# Patient Record
Sex: FEMALE | Race: WHITE
[De-identification: names, ages, dates, MRNs, and addresses within clinical notes are randomized per-mention and may not be internally consistent; named-entity substitution may affect disease eponyms.]

---
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
## 2018-02-05 ENCOUNTER — HOSPITAL ENCOUNTER (EMERGENCY)
Dept: HOSPITAL 25 - UCCORT | Age: 18
Discharge: HOME | End: 2018-02-05
Payer: COMMERCIAL

## 2018-02-05 VITALS — DIASTOLIC BLOOD PRESSURE: 65 MMHG | SYSTOLIC BLOOD PRESSURE: 127 MMHG

## 2018-02-05 DIAGNOSIS — J40: Primary | ICD-10-CM

## 2018-02-05 PROCEDURE — G0463 HOSPITAL OUTPT CLINIC VISIT: HCPCS

## 2018-02-05 PROCEDURE — 99212 OFFICE O/P EST SF 10 MIN: CPT

## 2018-02-05 PROCEDURE — 87502 INFLUENZA DNA AMP PROBE: CPT

## 2018-02-05 NOTE — UC
FLU HPI





- HPI Summary


HPI Summary: 





Pt c/o of sudden onset of cough, fever, chills, wheezing X 2 days. Pt has 

history of bronchitis





- History of Current Complaint


Chief Complaint: UCRespiratory


Stated Complaint: CHEST CONGESTION, RESPIRATORY


Time Seen by Provider: 02/05/18 20:02


Hx Obtained From: Patient


Hx Last Menstrual Period: 1/22/18


Pregnant?: No


Onset/Duration: Sudden Onset, Lasting Days


Severity Currently: Mild


Severity Initially: Mild


Pain Intensity: 3


Associated Signs & Symptoms: Positive: Fever, Cough


Related Hx: Possible Flu/Infectious Exposure





- Risk Factors


Influenza Risk Factors: Negative





- Allergy/Home Medications


Allergies/Adverse Reactions: 


 Allergies











Allergy/AdvReac Type Severity Reaction Status Date / Time


 


MS Ibuprofen [Ibuprofen] Allergy Severe Anaphylatic Verified 02/05/18 19:54





   Shock  











Home Medications: 


 Home Medications





Oral Contraceptives  DAILY 02/05/18 [History]











PMH/Surg Hx/FS Hx/Imm Hx


Previously Healthy: Yes





- Surgical History


Surgical History: None





- Family History


Known Family History: 


   Negative: Respiratory Disease





- Social History


Occupation: Student


Lives: Dormitory/Roommates


Alcohol Use: None


Substance Use Type: None


Smoking Status (MU): Never Smoked Tobacco


Have You Smoked in the Last Year: No





- Immunization History


Most Recent Influenza Vaccination: No flu vaccine


Vaccination Up to Date: Yes





Review of Systems


Constitutional: Fever, Chills, Fatigue


Skin: Negative


Eyes: Negative


ENT: Sinus Congestion


Respiratory: Shortness Of Breath, Cough


Cardiovascular: Negative


Gastrointestinal: Negative


Genitourinary: Negative


Motor: Negative


Neurovascular: Negative


Musculoskeletal: Negative


Neurological: Negative


Psychological: Negative


Is Patient Immunocompromised?: No


All Other Systems Reviewed And Are Negative: Yes





Physical Exam


Triage Information Reviewed: Yes


Appearance: Ill-Appearing


Vital Signs: 


 Initial Vital Signs











Temp  100.4 F   02/05/18 19:50


 


Pulse  95   02/05/18 19:50


 


Resp  16   02/05/18 19:50


 


BP  127/65   02/05/18 19:50


 


Pulse Ox  100   02/05/18 19:50











Eye Exam: Normal


ENT: Positive: Nasal congestion


Dental Exam: Normal


Neck exam: Normal


Respiratory Exam: Normal


Cardiovascular Exam: Normal


Musculoskeletal Exam: Normal


Neurological Exam: Normal


Psychological Exam: Normal


Skin Exam: Normal





Diagnostics





- Laboratory


Diagnostic Studies Completed/Ordered: negative rapid flu





Flu Course/Dx





- Differential Dx/Diagnosis


Differential Diagnosis/HQI/PQRI: Bronchitis, Influenza


Provider Diagnoses: Bronchitis





Discharge





- Discharge Plan


Condition: Stable


Disposition: HOME


Prescriptions: 


Albuterol HFA INHALER* [Ventolin HFA Inhaler*] 1 - 2 puff INH Q6H PRN #1 mdi


 PRN Reason: Sob/Wheezing


Azithromycin TAB* [Zithromax TAB (Z-NAT) 250 mg #6 tabs] 2 tab PO .TODAY, THEN 

1 DAILY #1 nat


predniSONE TAB* [Deltasone TAB*] 20 mg PO DAILY #5 tab


Patient Education Materials:  Acute Bronchitis (ED)


Referrals: 


[facility] [place] PHYSICIAN REFERRAL [Outside]


Non Staff,Doctor [Primary Care Provider] -

## 2019-04-18 ENCOUNTER — HOSPITAL ENCOUNTER (EMERGENCY)
Dept: HOSPITAL 25 - UCCORT | Age: 19
Discharge: HOME | End: 2019-04-18
Payer: COMMERCIAL

## 2019-04-18 VITALS — SYSTOLIC BLOOD PRESSURE: 124 MMHG | DIASTOLIC BLOOD PRESSURE: 80 MMHG

## 2019-04-18 DIAGNOSIS — Z88.8: ICD-10-CM

## 2019-04-18 DIAGNOSIS — J06.9: Primary | ICD-10-CM

## 2019-04-18 PROCEDURE — 87651 STREP A DNA AMP PROBE: CPT

## 2019-04-18 PROCEDURE — 99211 OFF/OP EST MAY X REQ PHY/QHP: CPT

## 2019-04-18 PROCEDURE — G0463 HOSPITAL OUTPT CLINIC VISIT: HCPCS

## 2019-04-18 NOTE — ED
Throat Pain/Nasal Congestion





- HPI Summary


HPI Summary: 





18 yr old female with the complaint of sore throat.  Onset four days ago.  The 

patient has sore throat, and no other symptoms.  She denies runny nose, post 

nasal drip, cough.  She has not had fever.  She has not had drooling.   





- History of Current Complaint


Chief Complaint: UCGeneralIllness


Time Seen by Provider: 04/18/19 18:13





- Allergies/Home Medications


Allergies/Adverse Reactions: 


 Allergies











Allergy/AdvReac Type Severity Reaction Status Date / Time


 


ibuprofen Allergy  Anaphylatic Verified 04/18/19 18:10





   Shock  











Home Medications: 


 Home Medications





Acetaminophen [Tylenol Extra Strength] 1,000 mg PO DAILY 04/18/19 [History 

Confirmed 04/18/19]











PMH/Surg Hx/FS Hx/Imm Hx


Infectious Disease History: No


Infectious Disease History: 


   Denies: Traveled Outside the [place] in Last 30 Days





- Family History


Known Family History: Positive: None


   Negative: Respiratory Disease





- Social History


Occupation: Student


Alcohol Use: None


Substance Use Type: Reports: None


Smoking Status (MU): Never Smoked Tobacco


Have You Smoked in the Last Year: No





Review of Systems


Constitutional: Negative


Positive: Sore Throat


All Other Systems Reviewed And Are Negative: Yes





Physical Exam


Triage Information Reviewed: Yes


Vital Signs On Initial Exam: 


 Initial Vitals











Temp Pulse Resp BP Pulse Ox


 


 99.0 F   97   16   124/80   100 


 


 04/18/19 18:06  04/18/19 18:06  04/18/19 18:06  04/18/19 18:06  04/18/19 18:06











Vital Signs Reviewed: Yes


Appearance: Positive: Well-Appearing, No Pain Distress


Skin: Positive: Warm, Skin Color Reflects Adequate Perfusion


Head/Face: Positive: Normal Head/Face Inspection


Eyes: Positive: EOMI, TED


ENT: Positive: Pharyngeal erythema, TMs normal.  Negative: Nasal congestion, 

Nasal drainage


Neck: Positive: Nontender, No Lymphadenopathy


Respiratory/Lung Sounds: Positive: Clear to Auscultation, Breath Sounds Present


Cardiovascular: Positive: RRR.  Negative: Murmur


Abdomen Description: Negative: Distended


Musculoskeletal: Positive: Strength/ROM Intact


Neurological: Positive: Sensory/Motor Intact, Alert, Oriented to Person Place, 

Time, CN Intact II-III, Normal Gait, Speech Normal


Psychiatric: Positive: Normal





Diagnostics





- Vital Signs


 Vital Signs











  Temp Pulse Resp BP Pulse Ox


 


 04/18/19 18:06  99.0 F  97  16  124/80  100














- Laboratory


Lab Statement: Any lab studies that have been ordered have been reviewed, and 

results considered in the medical decision making process.





EENT Course/Dx





- Course


Course Of Treatment: 18 yr old female with sore throat. Rapid strep is neg.  DC 

home.





- Diagnoses


Provider Diagnoses: 


 Upper respiratory infection








Discharge





- Sign-Out/Discharge


Documenting (check all that apply): Patient Departure


All imaging exams completed and their final reports reviewed: No Studies





- Discharge Plan


Condition: Good


Disposition: HOME


Patient Education Materials:  Upper Respiratory Infection (ED)


Referrals: 


No Primary Care Phys,NOPCP [Primary Care Provider] - 


[facility] [place] 2 [place]





- Billing Disposition and Condition


Condition: GOOD


Disposition: Home

## 2020-02-01 ENCOUNTER — HOSPITAL ENCOUNTER (EMERGENCY)
Dept: HOSPITAL 25 - UCCORT | Age: 20
Discharge: HOME | End: 2020-02-01
Payer: COMMERCIAL

## 2020-02-01 VITALS — DIASTOLIC BLOOD PRESSURE: 64 MMHG | SYSTOLIC BLOOD PRESSURE: 118 MMHG

## 2020-02-01 DIAGNOSIS — Z88.6: ICD-10-CM

## 2020-02-01 DIAGNOSIS — N76.0: Primary | ICD-10-CM

## 2020-02-01 PROCEDURE — G0463 HOSPITAL OUTPT CLINIC VISIT: HCPCS

## 2020-02-01 PROCEDURE — 99212 OFFICE O/P EST SF 10 MIN: CPT

## 2020-02-01 NOTE — UC
Complaint Female HPI





- HPI Summary


HPI Summary: 


Recently tx'd for BV by GYN last week but had intercourse during tx w/out 

condom and had her menses. 


She was also given a cream for vaginal fungus.


 Pt has a little odor, discharge still there and has some pain with urination. 

Also c/o itchiness.





- History Of Current Complaint


Chief Complaint: UCGeneralIllness


Stated Complaint: PERSONAL


Time Seen by Provider: 02/01/20 17:11


Hx Obtained From: Patient


Hx Last Menstrual Period: 4/18/19


Pain Intensity: 0


Aggravating Factor(s): [place]


Alleviating Factor(s): Nothing





- Allergies/Home Medications


Allergies/Adverse Reactions: 


 Allergies











Allergy/AdvReac Type Severity Reaction Status Date / Time


 


ibuprofen Allergy  Anaphylatic Verified 02/01/20 17:07





   Shock  














PMH/Surg Hx/FS Hx/Imm Hx





- Additional Past Medical History


Additional PMH: 





no chronic illness


Previously Healthy: Yes





- Surgical History


Surgical History: None





- Family History


Known Family History: Positive: None


   Negative: Respiratory Disease





- Social History


Alcohol Use: Weekly


Substance Use Type: None


Smoking Status (MU): Never Smoked Tobacco


Have You Smoked in the Last Year: No





- Immunization History


Most Recent Influenza Vaccination: No flu vaccine


Vaccination Up to Date: Yes





Review of Systems


All Other Systems Reviewed And Are Negative: Yes


Constitutional: Negative: Fever


Gastrointestinal: Negative: Abdominal Pain


Genitourinary: Positive: Vaginal/Penile Burning, Vaginal/Penile Itching, Vaginal

/Penile Discharge, Vaginal/Penile Pain.  Negative: Dysuria, Vaginal/Penile 

Tenderness, Abnormal Bleeding





Physical Exam


Triage Information Reviewed: Yes


Appearance: Well-Appearing


Vital Signs: 


 Initial Vital Signs











Temp  98.6 F   02/01/20 17:01


 


Pulse  69   02/01/20 17:01


 


Resp  18   02/01/20 17:01


 


BP  118/64   02/01/20 17:01


 


Pulse Ox  99   02/01/20 17:01











Pelvic Exam: Positive: Other - declined





 Complaint Female Dx





- Course


Course Of Treatment: 


Vaginitis after being tx'd for BV but was only tx'd for 5 days and had 

unprotected intercourse during tx.


Plan is to retreat, discussed no intercourse for now.


vitals good.


declined gu exam and chlam/jackelin exam stating it was neg last week.





- Differential Dx/Diagnosis


Differential Diagnosis/HQI/PQRI: Urinary Tract Infection, Other


Provider Diagnosis: 


 Vaginitis








Discharge ED





- Sign-Out/Discharge


Documenting (check all that apply): Patient Departure


All imaging exams completed and their final reports reviewed: No Studies





- Discharge Plan


Condition: Good


Disposition: HOME


Prescriptions: 


metroNIDAZOLE [Metronidazole 0.75 % gel] 1 applic VAGINAL BEDTIME 7 Days #7 gel


metroNIDAZOLE [Metronidazole 0.75 % gel] 1 applic VAGINAL BEDTIME 7 Days #7 gel


Patient Education Materials:  Bacterial Vaginosis (ED)


Referrals: 


No Primary Care Phys,NOPCP [Primary Care Provider] - 


Additional Instructions: 


It seems likely your bacterial vaginosis was incompletely treated so we will 

retreat again.





- Billing Disposition and Condition


Condition: GOOD


Disposition: Home